# Patient Record
Sex: MALE | URBAN - METROPOLITAN AREA
[De-identification: names, ages, dates, MRNs, and addresses within clinical notes are randomized per-mention and may not be internally consistent; named-entity substitution may affect disease eponyms.]

---

## 2023-05-02 ENCOUNTER — OFFICE VISIT (OUTPATIENT)
Dept: URBAN - METROPOLITAN AREA CLINIC 10 | Facility: CLINIC | Age: 48
End: 2023-05-02
Payer: COMMERCIAL

## 2023-05-02 DIAGNOSIS — H04.123 DRY EYE SYNDROME OF BILATERAL LACRIMAL GLANDS: ICD-10-CM

## 2023-05-02 DIAGNOSIS — Z83.511 FAMILY HISTORY OF GLAUCOMA: Primary | ICD-10-CM

## 2023-05-02 DIAGNOSIS — H52.4 PRESBYOPIA: ICD-10-CM

## 2023-05-02 PROCEDURE — 92004 COMPRE OPH EXAM NEW PT 1/>: CPT | Performed by: STUDENT IN AN ORGANIZED HEALTH CARE EDUCATION/TRAINING PROGRAM

## 2023-05-02 ASSESSMENT — VISUAL ACUITY
OD: 20/25
OS: 20/25

## 2023-05-02 ASSESSMENT — INTRAOCULAR PRESSURE
OS: 10
OD: 12

## 2023-05-02 NOTE — IMPRESSION/PLAN
Impression: Family history of glaucoma: Z83.511. Plan: Father had Glaucoma, ocular health WNL. IOP's today 12/10. CD OD .3 CD OS . [address]